# Patient Record
(demographics unavailable — no encounter records)

---

## 2024-11-14 NOTE — HISTORY OF PRESENT ILLNESS
[FreeTextEntry1] : HERE FOR Comprehensive annual examination  [de-identified] : diet- healthy exercise- no Has been in good overall health since his last CPE. h/o  Hyperlipidemia , vit d deficiency,  htn - not compliant with meds  breast cancer 1/ 2021  -b/l mastectomy -f/u breast sp - zeferino mri breast next week

## 2024-11-14 NOTE — HEALTH RISK ASSESSMENT
[Fair] :  ~his/her~ mood as fair [No] : No [No falls in past year] : Patient reported no falls in the past year [Patient reported colonoscopy was normal] : Patient reported colonoscopy was normal [HIV test declined] : HIV test declined [Hepatitis C test declined] : Hepatitis C test declined [Fully functional (bathing, dressing, toileting, transferring, walking, feeding)] : Fully functional (bathing, dressing, toileting, transferring, walking, feeding) [Fully functional (using the telephone, shopping, preparing meals, housekeeping, doing laundry, using] : Fully functional and needs no help or supervision to perform IADLs (using the telephone, shopping, preparing meals, housekeeping, doing laundry, using transportation, managing medications and managing finances) [MammogramComments] : f/u br sp [ColonoscopyDate] : 2022 [With Patient/Caregiver] : , with patient/caregiver [AdvancecareDate] : 11/2024

## 2025-04-11 NOTE — PHYSICAL EXAM
[Normal Breath Sounds] : Normal breath sounds [Normal Heart Sounds] : normal heart sounds [Alert] : alert [Oriented to Person] : oriented to person [Oriented to Place] : oriented to place [Oriented to Time] : oriented to time [Calm] : calm [Abdominal Masses] : No abdominal masses [No HSM] : no hepatosplenomegaly [de-identified] : WNL [de-identified] : WNL [de-identified] : JOYL [de-identified] : Soft, nontender, well-healed abdominoplasty incisions and trocar site incisions.  No Dinero sign or right upper quadrant tenderness. [de-identified] : WNL [de-identified] : WNL-no jaundice or scleral icterus

## 2025-04-11 NOTE — ASSESSMENT
[FreeTextEntry1] : In summary the patient presents for evaluation of right upper quadrant pain.  She develops intermittent right upper quadrant pain and associated abnormal liver function tests.  Most recent liver function tests and bilirubin are normal.  Ultrasound, CT and most recently MRCP demonstrate gallbladder sludge without evidence of gallstones or acute cholecystitis.  She has had multiple abdominal surgeries including laparoscopic gynecologic surgery and most recently a D IEP flap for breast reconstruction.  During that procedure retrofascial phasix mesh was placed.  Recent endoscopy demonstrates no obvious etiology for right upper quadrant pain.  I had a long conversation with the patient.  I explained that her pain may be secondary to biliary colic.  I feel a laparoscopic cholecystectomy would be reasonable as there is no obvious explanation for her symptoms.  I explained the risks benefits and alternatives including the risks of bleeding infection the possible need for an open procedure the rare incidence of bile duct injury bile leak retained common duct stones and postoperative loose stool.  Because of her multiple prior abdominal surgeries this should be performed in the main operating room.  I may also perform an intraoperative cholangiogram.

## 2025-04-11 NOTE — PHYSICAL EXAM
[Normal Breath Sounds] : Normal breath sounds [Normal Heart Sounds] : normal heart sounds [Alert] : alert [Oriented to Person] : oriented to person [Oriented to Place] : oriented to place [Oriented to Time] : oriented to time [Calm] : calm [Abdominal Masses] : No abdominal masses [No HSM] : no hepatosplenomegaly [de-identified] : WNL [de-identified] : WNL [de-identified] : JOYL [de-identified] : Soft, nontender, well-healed abdominoplasty incisions and trocar site incisions.  No Dinero sign or right upper quadrant tenderness. [de-identified] : WNL [de-identified] : WNL-no jaundice or scleral icterus

## 2025-04-11 NOTE — CONSULT LETTER
[Dear  ___] : Dear  [unfilled], [Consult Letter:] : I had the pleasure of evaluating your patient, [unfilled]. [Please see my note below.] : Please see my note below. [Consult Closing:] : Thank you very much for allowing me to participate in the care of this patient.  If you have any questions, please do not hesitate to contact me. [Sincerely,] : Sincerely, [FreeTextEntry3] : Saurabh Torrez M.D., F.A.C.S, F.A.S.C.R.S [DrOneal  ___] : Dr. DANG [DrOneal ___] : Dr. DANG

## 2025-04-11 NOTE — HISTORY OF PRESENT ILLNESS
[de-identified] : Kev is a 55 y/o female being seen for consultation  US Abdomen 10/9/24 (ruq pain) -  1.  Negative abdominal ultrasound. 2.  Etiology of RUQ not determined. 3.  RIGHT hepatic cyst, not significantly changed Gallbladder: No gallstones, or pericholecystic edema. Equivocal/trace gallbladder sludge.  Lab 11/11/24 Bilirubin 0.2 AST 23 ALT 24   Lab 1/11/25  Bilirubin 0.3  H AST 41 H ALT 72 H Lab 2/4/25 Bilirubin 0.3  AST 23 ALT 20  US Abdomen 2/11/25 - 1. There appear to be 2 separate compartments of the gallbladder  by a thick septation, possibly representing changes of focal adenomyomatosis. In the fundal compartment of the gallbladder, there appears to be a small amount of layering echogenic material compatible with sludge/small stones. For further evaluation of these findings, abdominal MRI/MRCP is recommended. No evidence for acute cholecystitis. No biliary dilatation. 2. There is slight interval increase in size of a lobulated in contour right hepatic lobe cyst which measures 3.7 x 2.6 x 3.2 cm. This could also be further assessed at MRI.  MRCP 3/8/25 (history of breast cancer.  Abnormal LFTs and right upper quadrant pain) - No evidence of metastatic disease in the abdomen.  Persistent mild right breast skin thickening, without significant change.  Clinical evaluation is recommended.   GALLBLADDER: Minimal adenomyomatosis in the body along the fold.  Minimal sludge.    Today patient reports RUQ abdominal pain since 5/2024 happened again in October but more dull pain, beginning of January had severe pain.  The pain was not associated with food.  Regular daily BMs.  Denies nausea or vomiting.  No fever or chills.  Not on any anticoagulants.

## 2025-04-11 NOTE — HISTORY OF PRESENT ILLNESS
[de-identified] : Kev is a 53 y/o female being seen for consultation  US Abdomen 10/9/24 (ruq pain) -  1.  Negative abdominal ultrasound. 2.  Etiology of RUQ not determined. 3.  RIGHT hepatic cyst, not significantly changed Gallbladder: No gallstones, or pericholecystic edema. Equivocal/trace gallbladder sludge.  Lab 11/11/24 Bilirubin 0.2 AST 23 ALT 24   Lab 1/11/25  Bilirubin 0.3  H AST 41 H ALT 72 H Lab 2/4/25 Bilirubin 0.3  AST 23 ALT 20  US Abdomen 2/11/25 - 1. There appear to be 2 separate compartments of the gallbladder  by a thick septation, possibly representing changes of focal adenomyomatosis. In the fundal compartment of the gallbladder, there appears to be a small amount of layering echogenic material compatible with sludge/small stones. For further evaluation of these findings, abdominal MRI/MRCP is recommended. No evidence for acute cholecystitis. No biliary dilatation. 2. There is slight interval increase in size of a lobulated in contour right hepatic lobe cyst which measures 3.7 x 2.6 x 3.2 cm. This could also be further assessed at MRI.  MRCP 3/8/25 (history of breast cancer.  Abnormal LFTs and right upper quadrant pain) - No evidence of metastatic disease in the abdomen.  Persistent mild right breast skin thickening, without significant change.  Clinical evaluation is recommended.   GALLBLADDER: Minimal adenomyomatosis in the body along the fold.  Minimal sludge.    Today patient reports RUQ abdominal pain since 5/2024 happened again in October but more dull pain, beginning of January had severe pain.  The pain was not associated with food.  Regular daily BMs.  Denies nausea or vomiting.  No fever or chills.  Not on any anticoagulants.

## 2025-05-05 NOTE — HISTORY OF PRESENT ILLNESS
[No Pertinent Cardiac History] : no history of aortic stenosis, atrial fibrillation, coronary artery disease, recent myocardial infarction, or implantable device/pacemaker [No Pertinent Pulmonary History] : no history of asthma, COPD, sleep apnea, or smoking [No Adverse Anesthesia Reaction] : no adverse anesthesia reaction in self or family member [(Patient denies any chest pain, claudication, dyspnea on exertion, orthopnea, palpitations or syncope)] : Patient denies any chest pain, claudication, dyspnea on exertion, orthopnea, palpitations or syncope [Chronic Anticoagulation] : no chronic anticoagulation [Chronic Kidney Disease] : no chronic kidney disease [Diabetes] : no diabetes [Good (7-10 METs)] : Good (7-10 METs) [FreeTextEntry1] : laparoscopic cholecystectomy [FreeTextEntry2] : 6/2/25 [FreeTextEntry3] : Saurabh Torrez MD [FreeTextEntry4] : 53 yo F with pre-DM, HLD, HTN, with cholelithiasis presents for pre-op clearance for laparoscopic cholecystectomy.   Pt will go to pre-surgical testing next week. [FreeTextEntry7] : Stress test 10/22 - normal CT calcium score 127 - 11/22

## 2025-05-05 NOTE — ASSESSMENT
[Patient Optimized for Surgery] : Patient optimized for surgery [Continue medications as is] : Continue current medications [As per surgery] : as per surgery [FreeTextEntry4] : 55 yo F is low cardiovascular risk for surgery.

## 2025-06-17 NOTE — PHYSICAL EXAM
[No HSM] : no hepatosplenomegaly [de-identified] : Soft, nontender, incisions healed without evidence of infection or hernia

## 2025-06-17 NOTE — HISTORY OF PRESENT ILLNESS
[de-identified] : Tye is a 53 y/o female being seen for a post-op visit, s/p Lap cholecystectomy on 6/2/25  Pathology: 1. Gallbladder, cholecystectomy - Chronic cholecystitis and cholelithiasis.  Today patient denies surgical incisional pain.  BMs regular once daily.  Good appetite.  Denies nausea or vomiting.  No fever or chills.  Surgical incision with no redness, mild swelling no drainage.

## 2025-06-17 NOTE — PHYSICAL EXAM
[No HSM] : no hepatosplenomegaly [de-identified] : Soft, nontender, incisions healed without evidence of infection or hernia

## 2025-06-17 NOTE — PHYSICAL EXAM
[No HSM] : no hepatosplenomegaly [de-identified] : Soft, nontender, incisions healed without evidence of infection or hernia

## 2025-06-17 NOTE — ASSESSMENT
[FreeTextEntry1] : In summary the patient is doing well 2-week status post laparoscopic cholecystectomy.  Her pathology revealed chronic cholecystitis with cholelithiasis.  Her postoperative course has been uncomplicated.  I instructed her that she may resume normal activities as tolerated and only needs to see me as needed.

## 2025-06-17 NOTE — HISTORY OF PRESENT ILLNESS
[de-identified] : Tye is a 55 y/o female being seen for a post-op visit, s/p Lap cholecystectomy on 6/2/25  Pathology: 1. Gallbladder, cholecystectomy - Chronic cholecystitis and cholelithiasis.  Today patient denies surgical incisional pain.  BMs regular once daily.  Good appetite.  Denies nausea or vomiting.  No fever or chills.  Surgical incision with no redness, mild swelling no drainage.

## 2025-06-17 NOTE — HISTORY OF PRESENT ILLNESS
[de-identified] : Tye is a 53 y/o female being seen for a post-op visit, s/p Lap cholecystectomy on 6/2/25  Pathology: 1. Gallbladder, cholecystectomy - Chronic cholecystitis and cholelithiasis.  Today patient denies surgical incisional pain.  BMs regular once daily.  Good appetite.  Denies nausea or vomiting.  No fever or chills.  Surgical incision with no redness, mild swelling no drainage.